# Patient Record
Sex: FEMALE | Race: WHITE | NOT HISPANIC OR LATINO | URBAN - METROPOLITAN AREA
[De-identification: names, ages, dates, MRNs, and addresses within clinical notes are randomized per-mention and may not be internally consistent; named-entity substitution may affect disease eponyms.]

---

## 2024-08-29 ENCOUNTER — OUTPATIENT (OUTPATIENT)
Dept: OUTPATIENT SERVICES | Facility: HOSPITAL | Age: 89
LOS: 1 days | Discharge: HOME | End: 2024-08-29
Payer: MEDICARE

## 2024-08-29 DIAGNOSIS — I49.5 SICK SINUS SYNDROME: ICD-10-CM

## 2024-08-29 LAB
GLUCOSE BLDC GLUCOMTR-MCNC: 81 MG/DL — SIGNIFICANT CHANGE UP (ref 70–99)
GLUCOSE BLDC GLUCOMTR-MCNC: 88 MG/DL — SIGNIFICANT CHANGE UP (ref 70–99)
ISTAT INR: 1 — SIGNIFICANT CHANGE UP (ref 0.88–1.16)
ISTAT PT: 10 SEC — SIGNIFICANT CHANGE UP (ref 10–12.9)
ISTAT VENOUS BE: 6 MMOL/L — HIGH (ref -2–3)
ISTAT VENOUS GLUCOSE: 86 MG/DL — SIGNIFICANT CHANGE UP (ref 70–99)
ISTAT VENOUS HCO3: 32 MMOL/L — HIGH (ref 23–28)
ISTAT VENOUS HEMATOCRIT: 31 % — LOW (ref 34.5–45)
ISTAT VENOUS HEMOGLOBIN: 10.5 GM/DL — LOW (ref 11.5–15.5)
ISTAT VENOUS IONIZED CALCIUM: 1.16 MMOL/L — SIGNIFICANT CHANGE UP (ref 1.12–1.3)
ISTAT VENOUS PCO2: 52 MMHG — HIGH (ref 41–51)
ISTAT VENOUS PH: 7.39 — SIGNIFICANT CHANGE UP (ref 7.31–7.41)
ISTAT VENOUS PO2: <66 MMHG — LOW (ref 35–40)
ISTAT VENOUS POTASSIUM: 4.3 MMOL/L — SIGNIFICANT CHANGE UP (ref 3.5–5.3)
ISTAT VENOUS SO2: 24 % — SIGNIFICANT CHANGE UP
ISTAT VENOUS SODIUM: 141 MMOL/L — SIGNIFICANT CHANGE UP (ref 135–145)
ISTAT VENOUS TCO2: 33 MMOL/L — HIGH (ref 22–31)
POCT ISTAT CREATININE: 1.2 MG/DL — SIGNIFICANT CHANGE UP (ref 0.5–1.3)

## 2024-08-29 PROCEDURE — C1889: CPT

## 2024-08-29 PROCEDURE — 82330 ASSAY OF CALCIUM: CPT

## 2024-08-29 PROCEDURE — 82565 ASSAY OF CREATININE: CPT

## 2024-08-29 PROCEDURE — 33228 REMV&REPLC PM GEN DUAL LEAD: CPT | Mod: KX

## 2024-08-29 PROCEDURE — 82803 BLOOD GASES ANY COMBINATION: CPT

## 2024-08-29 PROCEDURE — 85014 HEMATOCRIT: CPT

## 2024-08-29 PROCEDURE — 84295 ASSAY OF SERUM SODIUM: CPT

## 2024-08-29 PROCEDURE — C1785: CPT

## 2024-08-29 PROCEDURE — 82947 ASSAY GLUCOSE BLOOD QUANT: CPT

## 2024-08-29 PROCEDURE — 84132 ASSAY OF SERUM POTASSIUM: CPT

## 2024-08-29 PROCEDURE — 82962 GLUCOSE BLOOD TEST: CPT

## 2024-08-29 PROCEDURE — 85610 PROTHROMBIN TIME: CPT

## 2024-08-29 RX ORDER — OXYCODONE HYDROCHLORIDE 15 MG/1
1 TABLET ORAL
Refills: 0 | DISCHARGE

## 2024-08-29 RX ORDER — VANCOMYCIN/0.9 % SOD CHLORIDE 1.75G/25
1000 PLASTIC BAG, INJECTION (ML) INTRAVENOUS ONCE
Refills: 0 | Status: DISCONTINUED | OUTPATIENT
Start: 2024-08-29 | End: 2024-08-29

## 2024-08-29 RX ORDER — DEXTROSE 15 G/33 G
25 GEL IN PACKET (GRAM) ORAL ONCE
Refills: 0 | Status: DISCONTINUED | OUTPATIENT
Start: 2024-08-29 | End: 2024-08-29

## 2024-08-29 RX ORDER — GLUCAGON INJECTION, SOLUTION 1 MG/.2ML
1 INJECTION, SOLUTION SUBCUTANEOUS ONCE
Refills: 0 | Status: DISCONTINUED | OUTPATIENT
Start: 2024-08-29 | End: 2024-08-29

## 2024-08-29 RX ORDER — DEXTROSE 15 G/33 G
15 GEL IN PACKET (GRAM) ORAL ONCE
Refills: 0 | Status: DISCONTINUED | OUTPATIENT
Start: 2024-08-29 | End: 2024-08-29

## 2024-08-29 RX ORDER — EZETIMIBE 10 MG/1
10 TABLET ORAL DAILY
Refills: 0 | Status: DISCONTINUED | OUTPATIENT
Start: 2024-08-29 | End: 2024-08-29

## 2024-08-29 RX ORDER — EMPAGLIFLOZIN 10 MG/1
1 TABLET, FILM COATED ORAL
Refills: 0 | DISCHARGE

## 2024-08-29 RX ORDER — ATENOLOL 100 MG
1 TABLET ORAL
Refills: 0 | DISCHARGE

## 2024-08-29 RX ORDER — OXYCODONE HYDROCHLORIDE 15 MG/1
200 TABLET ORAL DAILY
Refills: 0 | Status: DISCONTINUED | OUTPATIENT
Start: 2024-08-29 | End: 2024-08-29

## 2024-08-29 RX ORDER — ASPIRIN 81 MG
1 TABLET, DELAYED RELEASE (ENTERIC COATED) ORAL
Refills: 0 | DISCHARGE

## 2024-08-29 RX ORDER — METFORMIN HYDROCHLORIDE 850 MG/1
1 TABLET, FILM COATED ORAL
Refills: 0 | DISCHARGE

## 2024-08-29 RX ORDER — VALSARTAN 40 MG/1
40 TABLET ORAL DAILY
Refills: 0 | Status: DISCONTINUED | OUTPATIENT
Start: 2024-08-29 | End: 2024-08-29

## 2024-08-29 RX ORDER — SIMVASTATIN 10 MG
1 TABLET ORAL
Refills: 0 | DISCHARGE

## 2024-08-29 RX ORDER — ASPIRIN 81 MG
81 TABLET, DELAYED RELEASE (ENTERIC COATED) ORAL DAILY
Refills: 0 | Status: DISCONTINUED | OUTPATIENT
Start: 2024-08-29 | End: 2024-08-29

## 2024-08-29 RX ORDER — ATENOLOL 100 MG
25 TABLET ORAL DAILY
Refills: 0 | Status: DISCONTINUED | OUTPATIENT
Start: 2024-08-29 | End: 2024-08-29

## 2024-08-29 RX ORDER — DEXTROSE 15 G/33 G
12.5 GEL IN PACKET (GRAM) ORAL ONCE
Refills: 0 | Status: DISCONTINUED | OUTPATIENT
Start: 2024-08-29 | End: 2024-08-29

## 2024-08-29 RX ORDER — VALSARTAN 40 MG/1
1 TABLET ORAL
Refills: 0 | DISCHARGE

## 2024-08-29 RX ORDER — NEBIVOLOL 20 MG/1
1 TABLET ORAL
Refills: 0 | DISCHARGE

## 2024-08-29 RX ORDER — FUROSEMIDE 40 MG
1 TABLET ORAL
Refills: 0 | DISCHARGE

## 2024-08-29 RX ORDER — EZETIMIBE 10 MG/1
1 TABLET ORAL
Refills: 0 | DISCHARGE

## 2024-08-29 NOTE — H&P ADULT - PROBLEM SELECTOR PLAN 1
[ ] NPO for PPM generator change.  [ ] Antibiotics for prophylaxis.   [ ] D/c home pending clinical status.

## 2024-08-29 NOTE — PROGRESS NOTE ADULT - SUBJECTIVE AND OBJECTIVE BOX
Cardiac Electrophysiology Admission Note    History of Present Illness: 89 y.o. F with pmhx DM, HTN, HLD, AF and SSS s/p PPM implant ([employer]) whose generator has reached OMAR and presents for generator change.        She is A+Ox1. She is accompanied by her aid and her son. She denies pain. She did not take her metformin this morning but she did take her jardiance.     Past Medical History:    see above    Past Surgical History:      Family History: Non-contributory    Social History: Non-smoker    Allergies: Possible PCN    Medications:   Furosemide 20mg 3x per week  Jardiance 10 mg QD  Ezetimibe 10mg QD  Atenolol 25mg QHS  Simvastatin 10mg QD  Nebivolol 10mg QD  Valsartan 40mg 4x per week  Amiodarone 200mg every other day  Aspirin 81mg QD  Metformin 500mg TID    Physical:   HR: 63		BP: 145/78		O2 Sat 100%  	Temp: afebrile    GEN: NAD  HEENT: no JVD  CARDS: S1S2 RRR  LUNGS: CTAB no w/r/r  EXT: no edema  NEURO: no deficit noted    EKG: Normal sinus rhythm.    A/P:    - NPO for generator change.  - D/c home following procedure pending clinical status.

## 2024-08-29 NOTE — H&P ADULT - ASSESSMENT
89 y.o. F with pmhx DM, HTN, HLD, AF and SSS s/p PPM implant ([employer]) whose generator has reached OMAR and presents for generator change.

## 2024-08-29 NOTE — H&P ADULT - NSHPPHYSICALEXAM_GEN_ALL_CORE
Constitutional: No acute Distress  Psych: Normal affect, normal mood  Neuro: A/o x 1, No focal deficits  Neck: Supple, NO JVD  CVS: S1 S2, No M/R/G  Pulmonary: CTAB, Breathing unlabored, No Rhonchi/Rales/Wheezing  GI: Soft, Non -tender, +BS x 4 quads  Skin: No rash, warm and dry, no erythematous areas   MSK: 5/5 strength, normal range of motion, no swollen or erythematous joints.

## 2024-08-29 NOTE — H&P ADULT - HISTORY OF PRESENT ILLNESS
89 y.o. F with pmhx DM, HTN, HLD, AF and SSS s/p PPM implant ([employer]) whose generator has reached OMAR and presents for generator change.        She is A+Ox1. She is accompanied by her aid and her son. She denies pain. She did not take her metformin this morning but she did take her jardiance.